# Patient Record
Sex: FEMALE | Race: WHITE | NOT HISPANIC OR LATINO | Employment: OTHER | URBAN - METROPOLITAN AREA
[De-identification: names, ages, dates, MRNs, and addresses within clinical notes are randomized per-mention and may not be internally consistent; named-entity substitution may affect disease eponyms.]

---

## 2021-12-02 ENCOUNTER — TELEPHONE (OUTPATIENT)
Dept: SCHEDULING | Facility: IMAGING CENTER | Age: 56
End: 2021-12-02

## 2021-12-02 ENCOUNTER — OFFICE VISIT (OUTPATIENT)
Dept: MEDICAL GROUP | Facility: PHYSICIAN GROUP | Age: 56
End: 2021-12-02
Payer: COMMERCIAL

## 2021-12-02 VITALS
WEIGHT: 202.6 LBS | RESPIRATION RATE: 16 BRPM | OXYGEN SATURATION: 98 % | DIASTOLIC BLOOD PRESSURE: 78 MMHG | BODY MASS INDEX: 30.01 KG/M2 | SYSTOLIC BLOOD PRESSURE: 112 MMHG | HEART RATE: 96 BPM | TEMPERATURE: 98.1 F | HEIGHT: 69 IN

## 2021-12-02 DIAGNOSIS — Z12.31 ENCOUNTER FOR SCREENING MAMMOGRAM FOR BREAST CANCER: ICD-10-CM

## 2021-12-02 DIAGNOSIS — L70.0 ACNE VULGARIS: ICD-10-CM

## 2021-12-02 DIAGNOSIS — Z12.11 SCREENING FOR COLORECTAL CANCER: ICD-10-CM

## 2021-12-02 DIAGNOSIS — Z00.00 ANNUAL PHYSICAL EXAM: ICD-10-CM

## 2021-12-02 DIAGNOSIS — E03.9 ACQUIRED HYPOTHYROIDISM: ICD-10-CM

## 2021-12-02 DIAGNOSIS — F32.A ANXIETY AND DEPRESSION: ICD-10-CM

## 2021-12-02 DIAGNOSIS — J30.89 ENVIRONMENTAL AND SEASONAL ALLERGIES: ICD-10-CM

## 2021-12-02 DIAGNOSIS — F41.9 ANXIETY AND DEPRESSION: ICD-10-CM

## 2021-12-02 DIAGNOSIS — Z12.12 SCREENING FOR COLORECTAL CANCER: ICD-10-CM

## 2021-12-02 PROCEDURE — 99214 OFFICE O/P EST MOD 30 MIN: CPT | Performed by: NURSE PRACTITIONER

## 2021-12-02 RX ORDER — CHOLECALCIFEROL (VITAMIN D3) 125 MCG
5 CAPSULE ORAL DAILY
COMMUNITY
End: 2023-04-19

## 2021-12-02 RX ORDER — TRETINOIN 0.5 MG/G
CREAM TOPICAL
COMMUNITY
Start: 2021-09-04 | End: 2023-04-19 | Stop reason: SDUPTHER

## 2021-12-02 RX ORDER — PSEUDOEPHEDRINE HCL 30 MG
60 TABLET ORAL EVERY 4 HOURS PRN
COMMUNITY

## 2021-12-02 RX ORDER — IBUPROFEN 200 MG
200 TABLET ORAL EVERY 6 HOURS PRN
COMMUNITY

## 2021-12-02 RX ORDER — LEVOTHYROXINE SODIUM 0.12 MG/1
125 TABLET ORAL
Qty: 30 TABLET | Refills: 0 | Status: SHIPPED | OUTPATIENT
Start: 2021-12-02 | End: 2021-12-23

## 2021-12-02 RX ORDER — BUPROPION HYDROCHLORIDE 300 MG/1
TABLET ORAL
COMMUNITY
Start: 2021-10-08 | End: 2022-01-19 | Stop reason: SDUPTHER

## 2021-12-02 ASSESSMENT — PATIENT HEALTH QUESTIONNAIRE - PHQ9
SUM OF ALL RESPONSES TO PHQ QUESTIONS 1-9: 20
5. POOR APPETITE OR OVEREATING: 3 - NEARLY EVERY DAY
CLINICAL INTERPRETATION OF PHQ2 SCORE: 2

## 2021-12-03 NOTE — ASSESSMENT & PLAN NOTE
Chronic problem.  Currently on bupropion  and citalopram 20 mg, previously worked well.  Patient went back to work and stress levels are higher, seems like needing reevaluation.  Denies SI, HI.

## 2021-12-03 NOTE — PROGRESS NOTES
Chief Complaint   Patient presents with   • Establish Care       HISTORY OF PRESENT ILLNESS: Patient is a 56 y.o. female, new  patient who presents today to discuss medical problems as listed below:    Health Maintenance:  COMPLETED     Acquired hypothyroidism  New to me.  Chronic problem.  Patient was diagnosed initially 20 years ago.  Currently on levothyroxine 125 mcg.  Last labs in the last 2 years, not available to view.  Patient reports hypothyroid symptoms (dry skin, constipation and hair loss), has 3 more pills left.  Needing refill today.    Anxiety and depression  Chronic problem.  Currently on bupropion  and citalopram 20 mg, previously worked well.  Patient went back to work and stress levels are higher, seems like needing reevaluation.  Denies SI, HI.    Acne vulgaris  New to me.  Currently on Retin-A and minocycline 100 mg which helps with acne and sinus congestion.    Environmental and seasonal allergies  New to me.  Chronic problem.  Currently on Allegra and Sudafed and Benadryl as needed.  Denies chest pressure, wheezing or cough.      Patient Active Problem List    Diagnosis Date Noted   • Acquired hypothyroidism 12/02/2021   • Anxiety and depression 12/02/2021   • Acne vulgaris 12/02/2021   • Environmental and seasonal allergies 12/02/2021        Allergies: Azithromycin    Current Outpatient Medications   Medication Sig Dispense Refill   • buPROPion (WELLBUTRIN XL) 300 MG XL tablet      • tretinoin (RETIN-A) 0.05 % cream      • pseudoephedrine (SUDAFED) 30 MG Tab Take 60 mg by mouth every four hours as needed for Congestion.     • Fexofenadine HCl (ALLEGRA PO) Take  by mouth.     • diphenhydrAMINE HCl (BENADRYL PO) Take  by mouth.     • melatonin 5 mg Tab Take 5 mg by mouth every day.     • ibuprofen (MOTRIN) 200 MG Tab Take 200 mg by mouth every 6 hours as needed.     • levothyroxine (SYNTHROID) 125 MCG Tab Take 1 Tablet by mouth every morning on an empty stomach. 30 Tablet 0   •  escitalopram (LEXAPRO) 20 MG tablet      • minocycline (MINOCIN) 100 MG Cap        No current facility-administered medications for this visit.       Social History     Tobacco Use   • Smoking status: Never Smoker   • Smokeless tobacco: Never Used   Vaping Use   • Vaping Use: Never used   Substance Use Topics   • Alcohol use: Yes     Comment: occ   • Drug use: Never     Social History     Social History Narrative   • Not on file       Family History   Problem Relation Age of Onset   • Hyperlipidemia Mother    • Hyperlipidemia Father        Allergies, past medical history, past surgical history, family history, social history reviewed and updated.    Review of Systems:     - Constitutional: Negative for fever, chills, unexpected weight change, and fatigue/generalized weakness.     - HEENT: Negative for headaches, vision changes, hearing changes, ear pain, ear discharge, rhinorrhea, sinus congestion, sore throat, and neck pain.      - Respiratory: Negative for cough, sputum production, chest congestion, dyspnea, wheezing, and crackles.      - Cardiovascular: Negative for chest pain, palpitations, orthopnea, and bilateral lower extremity edema.     - Gastrointestinal: Negative for heartburn, nausea, vomiting, abdominal pain, hematochezia, melena, diarrhea, constipation, and greasy/foul-smelling stools.     - Genitourinary: Negative for dysuria, polyuria, hematuria, pyuria, urinary urgency, and urinary incontinence.    - Musculoskeletal: Negative for myalgias, back pain, and joint pain.     - Skin: Negative for rash, itching, cyanotic skin color change.     - Neurological: Negative for dizziness, tingling, tremors, focal sensory deficit, focal weakness and headaches.     - Endo/Heme/Allergies: Does not bruise/bleed easily.     - Psychiatric/Behavioral: Negative for depression, suicidal/homicidal ideation and memory loss.      All other systems reviewed and are negative    Exam:    /78   Pulse 96   Temp 36.7 °C  "(98.1 °F) (Temporal)   Resp 16   Ht 1.746 m (5' 8.75\")   Wt 91.9 kg (202 lb 9.6 oz)   SpO2 98%   BMI 30.14 kg/m²  Body mass index is 30.14 kg/m².    Physical Exam:  Constitutional: Well-developed and well-nourished. Not diaphoretic. No distress.   Skin: Skin is warm and dry. No rash noted.  Head: Atraumatic without lesions.  Eyes: Conjunctivae and extraocular motions are normal. Pupils are equal, round, and reactive to light. No scleral icterus.   Ears:  External ears unremarkable. Tympanic membranes clear and intact.  Nose: Nares patent. Septum midline. Turbinates without erythema nor edema. No discharge.   Mouth/Throat: Dentition is normal. Tongue normal. Oropharynx is clear and moist. Posterior pharynx without erythema or exudates.  Neck: Supple, trachea midline. Normal range of motion. No thyromegaly present. No lymphadenopathy--cervical or supraclavicular.  Cardiovascular: Regular rate and rhythm, S1 and S2 without murmur, rubs, or gallops.    Chest: Effort normal. Clear to auscultation throughout. No adventitious sounds. No CVA tenderness.  Abdomen: Soft, non tender, and without distention. Active bowel sounds in all four quadrants. No rebound, guarding, masses or HSM.  : Negative for dysuria, polyuria, hematuria, pyuria, urinary urgency, and urinary incontinence.  Extremities: No cyanosis, clubbing, erythema, nor edema. Distal pulses intact and symmetric.   Musculoskeletal: All major joints AROM full in all directions without pain.  Neurological: Alert and oriented x 3. DTRs 2+/3 and symmetric. No cranial nerve deficit. 5/5 myotomes. Sensation intact. Negative Rhomberg.  Psychiatric:  Behavior, mood, and affect are appropriate.  MA/nursing note and vitals reviewed.    Assessment/Plan:  1. Acquired hypothyroidism  We will obtain lab data and discuss findings on the following visit.  30-day supply given.  - levothyroxine (SYNTHROID) 125 MCG Tab; Take 1 Tablet by mouth every morning on an empty stomach.  " Dispense: 30 Tablet; Refill: 0    2. Anxiety and depression  Stable at this time.  Will obtain labs and discuss findings with patient.  Will make adjustments as needed on the following visit    3. Acne vulgaris  Stable on current regimen.  Continue    4. Environmental and seasonal allergies  Stable on current regimen.  Continue    5. Screening for colorectal cancer  - Referral to GI for Colonoscopy    6. Encounter for screening mammogram for breast cancer  - MA-SCREENING MAMMO BILAT W/TOMOSYNTHESIS W/CAD; Future    7. Annual physical exam  - Comp Metabolic Panel; Future  - CBC WITHOUT DIFFERENTIAL; Future  - TSH; Future  - FREE THYROXINE; Future  - Lipid Profile; Future  - VITAMIN D,25 HYDROXY; Future       Discussed with patient possible alternative diagnoses, pt is to take all medications as prescribed. If symptoms persist FU w/PCP, if symptoms worsen go to emergency room. If experiencing any side effects from prescribed medications reports to the office immediately or go to emergency room.  Reviewed indication, dosage, usage and potential adverse effects of prescribed medications. Reviewed risks and benefits of treatment plan. Patient verbalizes understanding of all instruction and verbally agrees to plan.    No follow-ups on file. 2-3 wks

## 2021-12-03 NOTE — ASSESSMENT & PLAN NOTE
New to me.  Currently on Retin-A and minocycline 100 mg which helps with acne and sinus congestion.

## 2021-12-03 NOTE — ASSESSMENT & PLAN NOTE
New to me.  Chronic problem.  Currently on Allegra and Sudafed and Benadryl as needed.  Denies chest pressure, wheezing or cough.

## 2021-12-03 NOTE — ASSESSMENT & PLAN NOTE
New to me.  Chronic problem.  Patient was diagnosed initially 20 years ago.  Currently on levothyroxine 125 mcg.  Last labs in the last 2 years, not available to view.  Patient reports hypothyroid symptoms (dry skin, constipation and hair loss), has 3 more pills left.  Needing refill today.

## 2021-12-10 ENCOUNTER — HOSPITAL ENCOUNTER (OUTPATIENT)
Facility: MEDICAL CENTER | Age: 56
End: 2021-12-10
Attending: NURSE PRACTITIONER
Payer: COMMERCIAL

## 2021-12-10 DIAGNOSIS — Z00.00 ANNUAL PHYSICAL EXAM: ICD-10-CM

## 2021-12-10 LAB
ALBUMIN SERPL BCP-MCNC: 4.5 G/DL (ref 3.2–4.9)
ALBUMIN/GLOB SERPL: 1.8 G/DL
ALP SERPL-CCNC: 74 U/L (ref 30–99)
ALT SERPL-CCNC: 19 U/L (ref 2–50)
ANION GAP SERPL CALC-SCNC: 11 MMOL/L (ref 7–16)
AST SERPL-CCNC: 17 U/L (ref 12–45)
BILIRUB SERPL-MCNC: 0.2 MG/DL (ref 0.1–1.5)
BUN SERPL-MCNC: 20 MG/DL (ref 8–22)
CALCIUM SERPL-MCNC: 9 MG/DL (ref 8.5–10.5)
CHLORIDE SERPL-SCNC: 105 MMOL/L (ref 96–112)
CHOLEST SERPL-MCNC: 251 MG/DL (ref 100–199)
CO2 SERPL-SCNC: 25 MMOL/L (ref 20–33)
CREAT SERPL-MCNC: 0.82 MG/DL (ref 0.5–1.4)
ERYTHROCYTE [DISTWIDTH] IN BLOOD BY AUTOMATED COUNT: 41.1 FL (ref 35.9–50)
FASTING STATUS PATIENT QL REPORTED: NORMAL
GLOBULIN SER CALC-MCNC: 2.5 G/DL (ref 1.9–3.5)
GLUCOSE SERPL-MCNC: 83 MG/DL (ref 65–99)
HCT VFR BLD AUTO: 42.4 % (ref 37–47)
HDLC SERPL-MCNC: 49 MG/DL
HGB BLD-MCNC: 13.9 G/DL (ref 12–16)
LDLC SERPL CALC-MCNC: 178 MG/DL
MCH RBC QN AUTO: 28.8 PG (ref 27–33)
MCHC RBC AUTO-ENTMCNC: 32.8 G/DL (ref 33.6–35)
MCV RBC AUTO: 87.8 FL (ref 81.4–97.8)
PLATELET # BLD AUTO: 254 K/UL (ref 164–446)
PMV BLD AUTO: 10.2 FL (ref 9–12.9)
POTASSIUM SERPL-SCNC: 4.2 MMOL/L (ref 3.6–5.5)
PROT SERPL-MCNC: 7 G/DL (ref 6–8.2)
RBC # BLD AUTO: 4.83 M/UL (ref 4.2–5.4)
SODIUM SERPL-SCNC: 141 MMOL/L (ref 135–145)
T4 FREE SERPL-MCNC: 1.37 NG/DL (ref 0.93–1.7)
TRIGL SERPL-MCNC: 118 MG/DL (ref 0–149)
TSH SERPL DL<=0.005 MIU/L-ACNC: 0.26 UIU/ML (ref 0.38–5.33)
WBC # BLD AUTO: 5.1 K/UL (ref 4.8–10.8)

## 2021-12-10 PROCEDURE — 80061 LIPID PANEL: CPT

## 2021-12-10 PROCEDURE — 84439 ASSAY OF FREE THYROXINE: CPT

## 2021-12-10 PROCEDURE — 84443 ASSAY THYROID STIM HORMONE: CPT

## 2021-12-10 PROCEDURE — 82306 VITAMIN D 25 HYDROXY: CPT

## 2021-12-10 PROCEDURE — 36415 COLL VENOUS BLD VENIPUNCTURE: CPT

## 2021-12-10 PROCEDURE — 85027 COMPLETE CBC AUTOMATED: CPT

## 2021-12-10 PROCEDURE — 80053 COMPREHEN METABOLIC PANEL: CPT

## 2021-12-13 LAB — 25(OH)D3 SERPL-MCNC: 25 NG/ML (ref 30–80)

## 2021-12-23 ENCOUNTER — OFFICE VISIT (OUTPATIENT)
Dept: MEDICAL GROUP | Facility: PHYSICIAN GROUP | Age: 56
End: 2021-12-23
Payer: COMMERCIAL

## 2021-12-23 VITALS
WEIGHT: 202.8 LBS | RESPIRATION RATE: 18 BRPM | SYSTOLIC BLOOD PRESSURE: 122 MMHG | BODY MASS INDEX: 30.04 KG/M2 | OXYGEN SATURATION: 98 % | TEMPERATURE: 97.8 F | HEART RATE: 85 BPM | HEIGHT: 69 IN | DIASTOLIC BLOOD PRESSURE: 78 MMHG

## 2021-12-23 DIAGNOSIS — E78.2 MIXED HYPERLIPIDEMIA: ICD-10-CM

## 2021-12-23 DIAGNOSIS — E03.9 ACQUIRED HYPOTHYROIDISM: ICD-10-CM

## 2021-12-23 DIAGNOSIS — E55.9 VITAMIN D DEFICIENCY: ICD-10-CM

## 2021-12-23 PROCEDURE — 99214 OFFICE O/P EST MOD 30 MIN: CPT | Performed by: NURSE PRACTITIONER

## 2021-12-23 RX ORDER — ERGOCALCIFEROL 1.25 MG/1
50000 CAPSULE ORAL
Qty: 12 CAPSULE | Refills: 3 | Status: SHIPPED | OUTPATIENT
Start: 2021-12-23 | End: 2022-11-10 | Stop reason: SDUPTHER

## 2021-12-23 RX ORDER — LEVOTHYROXINE SODIUM 0.12 MG/1
TABLET ORAL
Qty: 30 TABLET | Refills: 3 | Status: SHIPPED | OUTPATIENT
Start: 2021-12-23 | End: 2022-03-30 | Stop reason: SDUPTHER

## 2021-12-23 RX ORDER — LEVOTHYROXINE SODIUM 112 UG/1
112 TABLET ORAL
Qty: 90 TABLET | Refills: 1 | Status: SHIPPED | OUTPATIENT
Start: 2021-12-23 | End: 2022-03-30

## 2021-12-23 ASSESSMENT — FIBROSIS 4 INDEX: FIB4 SCORE: 0.88

## 2021-12-23 NOTE — ASSESSMENT & PLAN NOTE
Results for ISAIAH GOODRICH (MRN [id number]) as of 12/23/2021 08:32   Ref. Range 12/10/2021 08:29   TSH Latest Ref Range: 0.380 - 5.330 uIU/mL 0.260 (L)   Free T-4 Latest Ref Range: 0.93 - 1.70 ng/dL 1.37   Levothyroxine 125 mcg.  Admits to anxiety and insomnia

## 2021-12-23 NOTE — PROGRESS NOTES
Chief Complaint   Patient presents with   • Lab Results       HISTORY OF PRESENT ILLNESS: Patient is a 56 y.o. female, established patient who presents today to discuss medical problems as listed below:    Health Maintenance:  COMPLETED     Mixed hyperlipidemia  Results for ISAIAH GOODRICH (MRN [id number]) as of 12/23/2021 08:32   Ref. Range 12/10/2021 08:29   Cholesterol,Tot Latest Ref Range: 100 - 199 mg/dL 251 (H)   Triglycerides Latest Ref Range: 0 - 149 mg/dL 118   HDL Latest Ref Range: >=40 mg/dL 49   LDL Latest Ref Range: <100 mg/dL 178 (H)   The 10-year ASCVD risk score (David LÓPEZ Jr., et al., 2013) is: 3%    Values used to calculate the score:      Age: 56 years      Sex: Female      Is Non- : No      Diabetic: No      Tobacco smoker: No      Systolic Blood Pressure: 122 mmHg      Is BP treated: No      HDL Cholesterol: 49 mg/dL      Total Cholesterol: 251 mg/dL     Patient is aware of putting in a few extra pounds during holidays.  She will resume healthy nutrition and exercise starting the new year.  Denies CP, dyspnea, dizziness or peripheral edema.    Acquired hypothyroidism  Results for ISAIAH GOODRICH (MRN [id number]) as of 12/23/2021 08:32   Ref. Range 12/10/2021 08:29   TSH Latest Ref Range: 0.380 - 5.330 uIU/mL 0.260 (L)   Free T-4 Latest Ref Range: 0.93 - 1.70 ng/dL 1.37   Levothyroxine 125 mcg.  Admits to anxiety and insomnia    Vitamin D deficiency  Results for ISAIAH GOODRICH (MRN [id number]) as of 12/23/2021 08:32   Ref. Range 12/10/2021 08:29   25-Hydroxy   Vitamin D 25 Latest Ref Range: 30 - 80 ng/mL 25 (L)       Patient Active Problem List    Diagnosis Date Noted   • Mixed hyperlipidemia 12/23/2021   • Vitamin D deficiency 12/23/2021   • Acquired hypothyroidism 12/02/2021   • Anxiety and depression 12/02/2021   • Acne vulgaris 12/02/2021   • Environmental and seasonal allergies 12/02/2021        Allergies: Azithromycin    Current Outpatient Medications   Medication Sig Dispense  "Refill   • levothyroxine (SYNTHROID) 112 MCG Tab Take 1 Tablet by mouth every morning on an empty stomach. 90 Tablet 1   • vitamin D2, Ergocalciferol, (DRISDOL) 1.25 MG (39105 UT) Cap capsule Take 1 Capsule by mouth every 7 days. 12 Capsule 3   • buPROPion (WELLBUTRIN XL) 300 MG XL tablet      • tretinoin (RETIN-A) 0.05 % cream      • pseudoephedrine (SUDAFED) 30 MG Tab Take 60 mg by mouth every four hours as needed for Congestion.     • Fexofenadine HCl (ALLEGRA PO) Take  by mouth.     • diphenhydrAMINE HCl (BENADRYL PO) Take  by mouth.     • melatonin 5 mg Tab Take 5 mg by mouth every day.     • ibuprofen (MOTRIN) 200 MG Tab Take 200 mg by mouth every 6 hours as needed.     • escitalopram (LEXAPRO) 20 MG tablet      • minocycline (MINOCIN) 100 MG Cap        No current facility-administered medications for this visit.       Social History     Tobacco Use   • Smoking status: Never Smoker   • Smokeless tobacco: Never Used   Vaping Use   • Vaping Use: Never used   Substance Use Topics   • Alcohol use: Yes     Comment: occ   • Drug use: Never     Social History     Social History Narrative   • Not on file       Family History   Problem Relation Age of Onset   • Hyperlipidemia Mother    • Hyperlipidemia Father        Allergies, past medical history, past surgical history, family history, social history reviewed and updated.    Review of Systems:     - Constitutional: Negative for fever, chills, unexpected weight change, and fatigue/generalized weakness.     - Respiratory: Negative for cough, sputum production, chest congestion, dyspnea, wheezing, and crackles.      - Cardiovascular: Negative for chest pain, palpitations, orthopnea, and bilateral lower extremity edema.     - Psychiatric/Behavioral: Negative for depression, suicidal/homicidal ideation and memory loss.      All other systems reviewed and are negative    Exam:    /78   Pulse 85   Temp 36.6 °C (97.8 °F) (Temporal)   Resp 18   Ht 1.746 m (5' 8.75\")  "  Wt 92 kg (202 lb 12.8 oz)   SpO2 98%   BMI 30.17 kg/m²  Body mass index is 30.17 kg/m².    Physical Exam:  Constitutional: Well-developed and well-nourished. Not diaphoretic. No distress.   Cardiovascular: Regular rate and rhythm, S1 and S2 without murmur, rubs, or gallops.    Chest: Effort normal. Clear to auscultation throughout. No adventitious sounds.   Neurological: Alert and oriented x 3.   Psychiatric:  Behavior, mood, and affect are appropriate.  MA/nursing note and vitals reviewed.    LABS:21/21   results reviewed and discussed with the patient, questions answered.    Assessment/Plan:  1. Mixed hyperlipidemia  Uncontrolled, stable. Discussed etiology and potential risk factors. Discussed 10-year ASCVD risk. Educated on healthy lifestyle including nutrition and exercise.  Avoid excessive red meat and simple carbohydrates.  Increase fiber intake to 25-30 g daily if tolerated and take fish oil 2 g nightly.  Advised low saturated fat, low carbohydrate diet.  Quit smoking if you do. Maintain adequate hydration. Advise daily exercise 45-60 mins per tolerance and preference.    2. Acquired hypothyroidism  Uncontrolled, stable.  Discussed etiology.  Will decrease levothyroxine 212 mcg  - TSH; Future  - FREE THYROXINE; Future  - levothyroxine (SYNTHROID) 112 MCG Tab; Take 1 Tablet by mouth every morning on an empty stomach.  Dispense: 90 Tablet; Refill: 1    3. Vitamin D deficiency  - vitamin D2, Ergocalciferol, (DRISDOL) 1.25 MG (97337 UT) Cap capsule; Take 1 Capsule by mouth every 7 days.  Dispense: 12 Capsule; Refill: 3       Discussed with patient possible alternative diagnoses, pt is to take all medications as prescribed. If symptoms persist FU w/PCP, if symptoms worsen go to emergency room. If experiencing any side effects from prescribed medications reports to the office immediately or go to emergency room.  Reviewed indication, dosage, usage and potential adverse effects of prescribed medications.  Reviewed risks and benefits of treatment plan. Patient verbalizes understanding of all instruction and verbally agrees to plan.    No follow-ups on file. 3 mos

## 2021-12-23 NOTE — ASSESSMENT & PLAN NOTE
Results for ISAIAH GOODRICH (MRN [id number]) as of 12/23/2021 08:32   Ref. Range 12/10/2021 08:29   25-Hydroxy   Vitamin D 25 Latest Ref Range: 30 - 80 ng/mL 25 (L)

## 2021-12-23 NOTE — ASSESSMENT & PLAN NOTE
Results for ISAIAH GOODRICH (MRN [id number]) as of 12/23/2021 08:32   Ref. Range 12/10/2021 08:29   Cholesterol,Tot Latest Ref Range: 100 - 199 mg/dL 251 (H)   Triglycerides Latest Ref Range: 0 - 149 mg/dL 118   HDL Latest Ref Range: >=40 mg/dL 49   LDL Latest Ref Range: <100 mg/dL 178 (H)   The 10-year ASCVD risk score (David LÓPEZ Jr., et al., 2013) is: 3%    Values used to calculate the score:      Age: 56 years      Sex: Female      Is Non- : No      Diabetic: No      Tobacco smoker: No      Systolic Blood Pressure: 122 mmHg      Is BP treated: No      HDL Cholesterol: 49 mg/dL      Total Cholesterol: 251 mg/dL     Patient is aware of putting in a few extra pounds during holidays.  She will resume healthy nutrition and exercise starting the new year.  Denies CP, dyspnea, dizziness or peripheral edema.

## 2022-01-19 DIAGNOSIS — F41.9 ANXIETY AND DEPRESSION: ICD-10-CM

## 2022-01-19 DIAGNOSIS — F32.A ANXIETY AND DEPRESSION: ICD-10-CM

## 2022-01-19 RX ORDER — ESCITALOPRAM OXALATE 20 MG/1
20 TABLET ORAL DAILY
Qty: 90 TABLET | Refills: 1 | Status: SHIPPED | OUTPATIENT
Start: 2022-01-19 | End: 2022-03-30 | Stop reason: SDUPTHER

## 2022-01-19 RX ORDER — BUPROPION HYDROCHLORIDE 300 MG/1
300 TABLET ORAL EVERY MORNING
Qty: 90 TABLET | Refills: 0 | Status: SHIPPED | OUTPATIENT
Start: 2022-01-19 | End: 2022-03-30 | Stop reason: SDUPTHER

## 2022-03-30 ENCOUNTER — OFFICE VISIT (OUTPATIENT)
Dept: MEDICAL GROUP | Facility: PHYSICIAN GROUP | Age: 57
End: 2022-03-30
Payer: COMMERCIAL

## 2022-03-30 VITALS
WEIGHT: 202.2 LBS | OXYGEN SATURATION: 98 % | SYSTOLIC BLOOD PRESSURE: 126 MMHG | RESPIRATION RATE: 18 BRPM | HEART RATE: 83 BPM | TEMPERATURE: 96.7 F | DIASTOLIC BLOOD PRESSURE: 78 MMHG | HEIGHT: 69 IN | BODY MASS INDEX: 29.95 KG/M2

## 2022-03-30 DIAGNOSIS — R06.02 SOB (SHORTNESS OF BREATH): ICD-10-CM

## 2022-03-30 DIAGNOSIS — Z00.00 ANNUAL PHYSICAL EXAM: ICD-10-CM

## 2022-03-30 DIAGNOSIS — E55.9 VITAMIN D DEFICIENCY: ICD-10-CM

## 2022-03-30 DIAGNOSIS — J30.89 ENVIRONMENTAL AND SEASONAL ALLERGIES: ICD-10-CM

## 2022-03-30 DIAGNOSIS — E03.9 ACQUIRED HYPOTHYROIDISM: ICD-10-CM

## 2022-03-30 DIAGNOSIS — R05.9 COUGH: ICD-10-CM

## 2022-03-30 DIAGNOSIS — R07.89 FEELING OF CHEST TIGHTNESS: ICD-10-CM

## 2022-03-30 DIAGNOSIS — L70.0 ACNE VULGARIS: ICD-10-CM

## 2022-03-30 DIAGNOSIS — F41.9 ANXIETY AND DEPRESSION: ICD-10-CM

## 2022-03-30 DIAGNOSIS — F32.A ANXIETY AND DEPRESSION: ICD-10-CM

## 2022-03-30 DIAGNOSIS — J30.2 SEASONAL ALLERGIC RHINITIS, UNSPECIFIED TRIGGER: ICD-10-CM

## 2022-03-30 PROBLEM — Z90.710 S/P LAPAROSCOPIC HYSTERECTOMY: Status: ACTIVE | Noted: 2019-11-06

## 2022-03-30 PROBLEM — D25.9 LEIOMYOMA OF BODY OF UTERUS: Status: ACTIVE | Noted: 2019-11-07

## 2022-03-30 PROCEDURE — 99214 OFFICE O/P EST MOD 30 MIN: CPT | Performed by: NURSE PRACTITIONER

## 2022-03-30 RX ORDER — LANOLIN ALCOHOL/MO/W.PET/CERES
1 CREAM (GRAM) TOPICAL DAILY
COMMUNITY
End: 2022-08-19

## 2022-03-30 RX ORDER — ACETAMINOPHEN 500 MG
500 TABLET ORAL
COMMUNITY
End: 2022-08-19

## 2022-03-30 RX ORDER — LEVOTHYROXINE SODIUM 0.12 MG/1
125 TABLET ORAL
Qty: 90 TABLET | Refills: 0 | Status: SHIPPED | OUTPATIENT
Start: 2022-03-30 | End: 2022-06-30

## 2022-03-30 RX ORDER — THYROID, PORCINE 60 MG/1
TABLET ORAL
COMMUNITY
End: 2022-08-19

## 2022-03-30 RX ORDER — TRIAMCINOLONE ACETONIDE 40 MG/ML
40 INJECTION, SUSPENSION INTRA-ARTICULAR; INTRAMUSCULAR ONCE
Status: COMPLETED | OUTPATIENT
Start: 2022-03-30 | End: 2022-03-30

## 2022-03-30 RX ORDER — LEVOTHYROXINE SODIUM 0.12 MG/1
125 TABLET ORAL
Qty: 30 TABLET | Refills: 1 | Status: SHIPPED | OUTPATIENT
Start: 2022-03-30 | End: 2022-03-30

## 2022-03-30 RX ORDER — MAGNESIUM OXIDE 400 MG/1
400 TABLET ORAL DAILY
COMMUNITY

## 2022-03-30 RX ORDER — ALBUTEROL SULFATE 90 UG/1
2 AEROSOL, METERED RESPIRATORY (INHALATION) EVERY 6 HOURS PRN
Qty: 8.5 G | Refills: 1 | Status: SHIPPED | OUTPATIENT
Start: 2022-03-30 | End: 2022-08-09

## 2022-03-30 RX ORDER — FLUTICASONE PROPIONATE 50 MCG
1 SPRAY, SUSPENSION (ML) NASAL DAILY
COMMUNITY
End: 2022-08-19

## 2022-03-30 RX ORDER — BUPROPION HYDROCHLORIDE 300 MG/1
300 TABLET ORAL EVERY MORNING
Qty: 90 TABLET | Refills: 0 | Status: SHIPPED | OUTPATIENT
Start: 2022-03-30 | End: 2022-06-30

## 2022-03-30 RX ORDER — MINOCYCLINE HYDROCHLORIDE 100 MG/1
100 CAPSULE ORAL 2 TIMES DAILY
Qty: 60 CAPSULE | Refills: 1 | Status: SHIPPED | OUTPATIENT
Start: 2022-03-30 | End: 2022-08-16

## 2022-03-30 RX ORDER — ESCITALOPRAM OXALATE 20 MG/1
20 TABLET ORAL DAILY
Qty: 90 TABLET | Refills: 0 | Status: SHIPPED | OUTPATIENT
Start: 2022-03-30 | End: 2022-07-05 | Stop reason: SDUPTHER

## 2022-03-30 RX ADMIN — TRIAMCINOLONE ACETONIDE 40 MG: 40 INJECTION, SUSPENSION INTRA-ARTICULAR; INTRAMUSCULAR at 09:40

## 2022-03-30 ASSESSMENT — PATIENT HEALTH QUESTIONNAIRE - PHQ9
5. POOR APPETITE OR OVEREATING: 3 - NEARLY EVERY DAY
SUM OF ALL RESPONSES TO PHQ QUESTIONS 1-9: 23
CLINICAL INTERPRETATION OF PHQ2 SCORE: 6

## 2022-03-30 ASSESSMENT — FIBROSIS 4 INDEX: FIB4 SCORE: 0.88

## 2022-03-30 NOTE — ASSESSMENT & PLAN NOTE
Pt reports exacerbating allergy symptoms incl cough, chest tightness, nasal drainage, PND after exercise, worse when outside and during allergy season. No CP, SOB, no wheezing. No fevers.   Received conrticosteroid injection in 2005, tolerated well.  Taking Allegra, Benadryl and Flonase, somewhat helpful but does not last.  No Hx of DM, fractures, no Hx of osteoporosis.

## 2022-03-30 NOTE — PROGRESS NOTES
Chief Complaint   Patient presents with   • Follow-Up     Increasing lexapro   • Seasonal Allergies   • Chest Pain     Comes and goes for the past wk        HISTORY OF PRESENT ILLNESS: Patient is a 56 y.o. female, established patient who presents today to discuss medical problems as listed below:    Health Maintenance:  COMPLETED     Environmental and seasonal allergies  Pt reports cough, chest tightness, nasal drainage, PND after exercise, worse when outside and during allergy season. No CP, SOB, no wheezing. No fevers.   Received steroid injection in 2005, tolerated well.  Taking Allegra, bennadryl and flonase.  No Hx of DM, fractures, no Hx of osteoporosis.     Acne vulgaris  Chronic problem. Symptoms include tender Cystic facial rash. Well controlled with minocycline 100 mg BID. Needing refills today.       Patient Active Problem List    Diagnosis Date Noted   • Mixed hyperlipidemia 12/23/2021   • Vitamin D deficiency 12/23/2021   • Acquired hypothyroidism 12/02/2021   • Anxiety and depression 12/02/2021   • Acne vulgaris 12/02/2021   • Environmental and seasonal allergies 12/02/2021   • Leiomyoma of body of uterus 11/07/2019   • S/P laparoscopic hysterectomy 11/06/2019   • Allergic rhinitis 09/30/2015        Allergies: Azithromycin    Current Outpatient Medications   Medication Sig Dispense Refill   • acetaminophen (TYLENOL) 500 MG Tab Take 500 mg by mouth.     • vitamin D3 (CHOLECALCIFEROL) 5000 Unit (125 mcg) Tab Take 5,000 Units by mouth every day.     • diphenhydramine (SOMINEX) 25 MG tablet Take 25 mg by mouth.     • fluticasone (FLONASE) 50 MCG/ACT nasal spray Administer 1 Spray into affected nostril(S) every day.     • glucosamine-chondroitin 500-400 MG tablet Take 1 Tablet by mouth every day.     • magnesium oxide (MAG-OX) 400 MG Tab tablet Take 400 mg by mouth every day.     • Multiple Vitamin (MULTI-VITAMIN) Tab Take 1 Tablet by mouth every day.     • thyroid 65 MG tablet Take  by mouth.     •  albuterol 108 (90 Base) MCG/ACT Aero Soln inhalation aerosol Inhale 2 Puffs every 6 hours as needed for Shortness of Breath. 8.5 g 1   • levothyroxine (SYNTHROID) 125 MCG Tab Take 1 Tablet by mouth every morning on an empty stomach. 30 Tablet 1   • buPROPion (WELLBUTRIN XL) 300 MG XL tablet Take 1 Tablet by mouth every morning. 90 Tablet 0   • escitalopram (LEXAPRO) 20 MG tablet Take 1 Tablet by mouth every day. 90 Tablet 0   • minocycline (MINOCIN) 100 MG Cap Take 1 Capsule by mouth 2 times a day. 60 Capsule 1   • vitamin D2, Ergocalciferol, (DRISDOL) 1.25 MG (79942 UT) Cap capsule Take 1 Capsule by mouth every 7 days. 12 Capsule 3   • tretinoin (RETIN-A) 0.05 % cream      • pseudoephedrine (SUDAFED) 30 MG Tab Take 60 mg by mouth every four hours as needed for Congestion.     • Fexofenadine HCl (ALLEGRA PO) Take  by mouth.     • diphenhydrAMINE HCl (BENADRYL PO) Take  by mouth.     • melatonin 5 mg Tab Take 5 mg by mouth every day.     • ibuprofen (MOTRIN) 200 MG Tab Take 200 mg by mouth every 6 hours as needed.       Current Facility-Administered Medications   Medication Dose Route Frequency Provider Last Rate Last Admin   • triamcinolone acetonide (KENALOG-40) injection 40 mg  40 mg Intramuscular Once COLLIN Sanchez           Social History     Tobacco Use   • Smoking status: Never Smoker   • Smokeless tobacco: Never Used   Vaping Use   • Vaping Use: Never used   Substance Use Topics   • Alcohol use: Yes     Comment: occ   • Drug use: Never     Social History     Social History Narrative   • Not on file       Family History   Problem Relation Age of Onset   • Hyperlipidemia Mother    • Hyperlipidemia Father        Allergies, past medical history, past surgical history, family history, social history reviewed and updated.    Review of Systems:     - Constitutional: Negative for fever, chills, unexpected weight change, and fatigue/generalized weakness.     - HEENT: Negative for headaches, vision changes,  "hearing changes, ear pain, ear discharge, rhinorrhea, sinus congestion, sore throat, and neck pain.      - Respiratory: Negative for cough, sputum production, chest congestion, dyspnea, wheezing, and crackles.      - Cardiovascular: Negative for chest pain, palpitations, orthopnea, and bilateral lower extremity edema.     - Gastrointestinal: Negative for heartburn, nausea, vomiting, abdominal pain, hematochezia, melena, diarrhea, constipation, and greasy/foul-smelling stools.     - Genitourinary: Negative for dysuria, polyuria, hematuria, pyuria, urinary urgency, and urinary incontinence.    - Musculoskeletal: Negative for myalgias, back pain, and joint pain.     - Skin: Negative for rash, itching, cyanotic skin color change.     - Neurological: Negative for dizziness, tingling, tremors, focal sensory deficit, focal weakness and headaches.     - Endo/Heme/Allergies: Does not bruise/bleed easily.     - Psychiatric/Behavioral: Negative for depression, suicidal/homicidal ideation and memory loss.      All other systems reviewed and are negative    Exam:    /78   Pulse 83   Temp 35.9 °C (96.7 °F) (Temporal)   Resp 18   Ht 1.746 m (5' 8.75\")   Wt 91.7 kg (202 lb 3.2 oz)   SpO2 98%   BMI 30.08 kg/m²  Body mass index is 30.08 kg/m².    Physical Exam:  Constitutional: Well-developed and well-nourished. Not diaphoretic. No distress.   Skin: Skin is warm and dry. No rash noted.  Head: Atraumatic without lesions.  Eyes: Conjunctivae and extraocular motions are normal. Pupils are equal, round, and reactive to light. No scleral icterus.   Ears:  External ears unremarkable. Tympanic membranes clear and intact.  Nose: Nares patent. Septum midline. Turbinates without erythema nor edema. No discharge.   Mouth/Throat: Dentition is normal. Tongue normal. Oropharynx is clear and moist. Posterior pharynx without erythema or exudates.  Neck: Supple, trachea midline. Normal range of motion. No thyromegaly present. No " lymphadenopathy--cervical or supraclavicular.  Cardiovascular: Regular rate and rhythm, S1 and S2 without murmur, rubs, or gallops.    Chest: Effort normal. Clear to auscultation throughout. No adventitious sounds. No CVA tenderness.  Abdomen: Soft, non tender, and without distention. Active bowel sounds in all four quadrants. No rebound, guarding, masses or HSM.  : Negative for dysuria, polyuria, hematuria, pyuria, urinary urgency, and urinary incontinence.  Extremities: No cyanosis, clubbing, erythema, nor edema. Distal pulses intact and symmetric.   Musculoskeletal: All major joints AROM full in all directions without pain.  Neurological: Alert and oriented x 3. DTRs 2+/3 and symmetric. No cranial nerve deficit. 5/5 myotomes. Sensation intact. Negative Rhomberg.  Psychiatric:  Behavior, mood, and affect are appropriate.  MA/nursing note and vitals reviewed.    LABS: pt needs to obtain        Assessment/Plan:    Pt needed med refills for the next two mos, she is busy at work and will obtain annual labs in a month.     1. Environmental and seasonal allergies  Will test for adult onset of asthma.   - PULMONARY FUNCTION TESTS -Test requested: Complete Pulmonary Function Test; Future    2. Cough  - PULMONARY FUNCTION TESTS -Test requested: Complete Pulmonary Function Test; Future  - DX-CHEST-2 VIEWS; Future    3. Feeling of chest tightness  - PULMONARY FUNCTION TESTS -Test requested: Complete Pulmonary Function Test; Future  - DX-CHEST-2 VIEWS; Future    4. SOB (shortness of breath)  - DX-CHEST-2 VIEWS; Future  - albuterol 108 (90 Base) MCG/ACT Aero Soln inhalation aerosol; Inhale 2 Puffs every 6 hours as needed for Shortness of Breath.  Dispense: 8.5 g; Refill: 1    5. Seasonal allergic rhinitis, unspecified trigger  - triamcinolone acetonide (KENALOG-40) injection 40 mg    6. Annual physical exam  - VITAMIN B12; Future  - FOLATE; Future  - Comp Metabolic Panel; Future  - CBC WITHOUT DIFFERENTIAL; Future  - Lipid  Profile; Future  - VITAMIN D,25 HYDROXY; Future    7. Acquired hypothyroidism  - levothyroxine (SYNTHROID) 125 MCG Tab; Take 1 Tablet by mouth every morning on an empty stomach.  Dispense: 30 Tablet; Refill: 1    8. Anxiety and depression  - buPROPion (WELLBUTRIN XL) 300 MG XL tablet; Take 1 Tablet by mouth every morning.  Dispense: 90 Tablet; Refill: 0  - escitalopram (LEXAPRO) 20 MG tablet; Take 1 Tablet by mouth every day.  Dispense: 90 Tablet; Refill: 0    9. Acne vulgaris  - minocycline (MINOCIN) 100 MG Cap; Take 1 Capsule by mouth 2 times a day.  Dispense: 60 Capsule; Refill: 1    10. Vitamin D deficiency  - VITAMIN D,25 HYDROXY; Future         Discussed with patient possible alternative diagnoses, pt is to take all medications as prescribed. If symptoms persist FU w/PCP, if symptoms worsen go to emergency room. If experiencing any side effects from prescribed medications reports to the office immediately or go to emergency room.  Reviewed indication, dosage, usage and potential adverse effects of prescribed medications. Reviewed risks and benefits of treatment plan. Patient verbalizes understanding of all instruction and verbally agrees to plan.    No follow-ups on file. 1-2 mos

## 2022-03-30 NOTE — ASSESSMENT & PLAN NOTE
Chronic problem. Symptoms include tender Cystic facial rash. Well controlled with minocycline 100 mg BID. Needing refills today.

## 2022-06-28 DIAGNOSIS — F32.A ANXIETY AND DEPRESSION: ICD-10-CM

## 2022-06-28 DIAGNOSIS — F41.9 ANXIETY AND DEPRESSION: ICD-10-CM

## 2022-06-30 RX ORDER — BUPROPION HYDROCHLORIDE 300 MG/1
TABLET ORAL
Qty: 90 TABLET | Refills: 0 | Status: SHIPPED | OUTPATIENT
Start: 2022-06-30 | End: 2022-09-21

## 2022-07-05 DIAGNOSIS — F41.9 ANXIETY AND DEPRESSION: ICD-10-CM

## 2022-07-05 DIAGNOSIS — F32.A ANXIETY AND DEPRESSION: ICD-10-CM

## 2022-07-05 DIAGNOSIS — E03.9 ACQUIRED HYPOTHYROIDISM: ICD-10-CM

## 2022-07-05 RX ORDER — ESCITALOPRAM OXALATE 20 MG/1
20 TABLET ORAL DAILY
Qty: 90 TABLET | Refills: 0 | Status: SHIPPED | OUTPATIENT
Start: 2022-07-05 | End: 2022-07-27

## 2022-08-19 ENCOUNTER — OFFICE VISIT (OUTPATIENT)
Dept: MEDICAL GROUP | Facility: PHYSICIAN GROUP | Age: 57
End: 2022-08-19
Payer: COMMERCIAL

## 2022-08-19 VITALS
BODY MASS INDEX: 30.34 KG/M2 | HEART RATE: 98 BPM | OXYGEN SATURATION: 97 % | RESPIRATION RATE: 16 BRPM | TEMPERATURE: 98.6 F | DIASTOLIC BLOOD PRESSURE: 72 MMHG | WEIGHT: 200.2 LBS | HEIGHT: 68 IN | SYSTOLIC BLOOD PRESSURE: 116 MMHG

## 2022-08-19 DIAGNOSIS — Z12.31 ENCOUNTER FOR SCREENING MAMMOGRAM FOR MALIGNANT NEOPLASM OF BREAST: ICD-10-CM

## 2022-08-19 DIAGNOSIS — N95.1 HOT FLASHES DUE TO MENOPAUSE: ICD-10-CM

## 2022-08-19 DIAGNOSIS — R21 RASH: ICD-10-CM

## 2022-08-19 DIAGNOSIS — E03.9 ACQUIRED HYPOTHYROIDISM: ICD-10-CM

## 2022-08-19 PROCEDURE — 99214 OFFICE O/P EST MOD 30 MIN: CPT | Performed by: NURSE PRACTITIONER

## 2022-08-19 RX ORDER — LEVOTHYROXINE SODIUM 0.12 MG/1
125 TABLET ORAL
Qty: 90 TABLET | Refills: 0 | Status: SHIPPED | OUTPATIENT
Start: 2022-08-19 | End: 2022-11-10 | Stop reason: SDUPTHER

## 2022-08-19 ASSESSMENT — FIBROSIS 4 INDEX: FIB4 SCORE: 0.88

## 2022-08-19 NOTE — ASSESSMENT & PLAN NOTE
Hot flashes since the age of 54, underwent hysterectomy.  Has been on transdermal estrogen pellets in the past.  After that has been taking 0.1 mg/day. Would like to restart, has been off this for 1 yr.   No Hx of breast ca, no OB GYN cancers.

## 2022-08-19 NOTE — ASSESSMENT & PLAN NOTE
New problem.  Small round bluish indented patch on right lateral buttock x3 months, not itchy, nonpainful, nontender, no bleeding.  Has not tried any treatment.

## 2022-08-19 NOTE — PROGRESS NOTES
Chief Complaint   Patient presents with    Medication Refill    Rash     buttocks       HISTORY OF PRESENT ILLNESS: Patient is a 56 y.o. female, established patient who presents today to discuss medical problems as listed below:    Health Maintenance:  COMPLETED     Hot flashes due to menopause  Hot flashes since the age of 54, underwent hysterectomy.  Has been on transdermal estrogen pellets in the past.  After that has been taking 0.1 mg/day. Would like to restart, has been off this for 1 yr.   No Hx of breast ca, no OB GYN cancers.    Rash  New problem.  Small round bluish indented patch on right lateral buttock x3 months, not itchy, nonpainful, nontender, no bleeding.  Has not tried any treatment.    Acquired hypothyroidism  [facility] [place]  Unable to obtain data at this time.  She denies hyper or hyperthyroid symptoms.  She is currently on levothyroxine 125 mcg.  Needing refills today.    Patient Active Problem List    Diagnosis Date Noted    Rash 08/19/2022    Hot flashes due to menopause 08/19/2022    Mixed hyperlipidemia 12/23/2021    Vitamin D deficiency 12/23/2021    Acquired hypothyroidism 12/02/2021    Anxiety and depression 12/02/2021    Acne vulgaris 12/02/2021    Environmental and seasonal allergies 12/02/2021    Leiomyoma of body of uterus 11/07/2019    S/P laparoscopic hysterectomy 11/06/2019    Allergic rhinitis 09/30/2015        Allergies: Azithromycin    Current Outpatient Medications   Medication Sig Dispense Refill    estradiol (CLIMARA) 0.1 MG/24HR PATCH WEEKLY       levothyroxine (SYNTHROID) 125 MCG Tab Take 1 Tablet by mouth every morning on an empty stomach. 90 Tablet 0    minocycline (MINOCIN) 100 MG Cap TAKE 1 CAPSULE BY MOUTH TWICE A DAY 60 Capsule 0    albuterol 108 (90 Base) MCG/ACT Aero Soln inhalation aerosol INHALE 2 PUFFS BY MOUTH EVERY 6 HOURS AS NEEDED FOR SHORTNESS OF BREATH 8.5 Each 0    escitalopram (LEXAPRO) 20 MG tablet TAKE 1  TABLET BY MOUTH EVERY DAY 90 Tablet 0    buPROPion (WELLBUTRIN XL) 300 MG XL tablet TAKE 1 TABLET BY MOUTH EVERY DAY IN THE MORNING 90 Tablet 0    magnesium oxide (MAG-OX) 400 MG Tab tablet Take 400 mg by mouth every day.      Multiple Vitamin (MULTI-VITAMIN) Tab Take 1 Tablet by mouth every day.      vitamin D2, Ergocalciferol, (DRISDOL) 1.25 MG (56061 UT) Cap capsule Take 1 Capsule by mouth every 7 days. 12 Capsule 3    tretinoin (RETIN-A) 0.05 % cream       pseudoephedrine (SUDAFED) 30 MG Tab Take 60 mg by mouth every four hours as needed for Congestion.      Fexofenadine HCl (ALLEGRA PO) Take  by mouth.      diphenhydrAMINE HCl (BENADRYL PO) Take  by mouth.      melatonin 5 mg Tab Take 5 mg by mouth every day.      ibuprofen (MOTRIN) 200 MG Tab Take 200 mg by mouth every 6 hours as needed.       No current facility-administered medications for this visit.       Social History     Tobacco Use    Smoking status: Never    Smokeless tobacco: Never   Vaping Use    Vaping Use: Never used   Substance Use Topics    Alcohol use: Yes     Comment: occ    Drug use: Never     Social History     Social History Narrative    Not on file       Family History   Problem Relation Age of Onset    Hyperlipidemia Mother     Hyperlipidemia Father        Allergies, past medical history, past surgical history, family history, social history reviewed and updated.    Review of Systems:     - Constitutional: Negative for fever, chills, unexpected weight change, and fatigue/generalized weakness.     - Respiratory: Negative for cough, sputum production, chest congestion, dyspnea, wheezing, and crackles.      - Cardiovascular: Negative for chest pain, palpitations, orthopnea, and bilateral lower extremity edema.       - Skin: small bluish patch on Right buttock     - Psychiatric/Behavioral: Negative for depression, suicidal/homicidal ideation and memory loss.      All other systems reviewed and are negative    Exam:    /72 (BP Location:  "Right arm, Patient Position: Sitting, BP Cuff Size: Adult)   Pulse 98   Temp 37 °C (98.6 °F) (Temporal)   Resp 16   Ht 1.727 m (5' 8\")   Wt 90.8 kg (200 lb 3.2 oz)   SpO2 97%   BMI 30.44 kg/m²  Body mass index is 30.44 kg/m².    Physical Exam:  Constitutional: Well-developed and well-nourished. Not diaphoretic. No distress.   Skin: Skin is warm and dry. No rash noted.  Cardiovascular: Regular rate and rhythm, S1 and S2 without murmur, rubs, or gallops.    Chest: Effort normal. Clear to auscultation throughout. No adventitious sounds. Neurological: Alert and oriented x 3.   Psychiatric:  Behavior, mood, and affect are appropriate.  MA/nursing note and vitals reviewed.    Assessment/Plan:  1. Rash  Idiopathic. Trial of OTC cortisone cream.  If no improvement or getting worse come back for reevaluation.    2. Acquired hypothyroidism  Will obtain lab results, refills given  - levothyroxine (SYNTHROID) 125 MCG Tab; Take 1 Tablet by mouth every morning on an empty stomach.  Dispense: 90 Tablet; Refill: 0    3. Hot flashes due to menopause  Pt to obtain mammogram, will follow up in 4 wks    4. Encounter for screening mammogram for malignant neoplasm of breast  - MA-SCREENING MAMMO BILAT W/TOMOSYNTHESIS W/CAD; Future       Discussed with patient possible alternative diagnoses, pt is to take all medications as prescribed. If symptoms persist FU w/PCP, if symptoms worsen go to emergency room. If experiencing any side effects from prescribed medications reports to the office immediately or go to emergency room.  Reviewed indication, dosage, usage and potential adverse effects of prescribed medications. Reviewed risks and benefits of treatment plan. Patient verbalizes understanding of all instruction and verbally agrees to plan.    No follow-ups on file. 1 mos  "

## 2022-08-19 NOTE — ASSESSMENT & PLAN NOTE
[facility] [place]  Unable to obtain data at this time.  She denies hyper or hyperthyroid symptoms.  She is currently on levothyroxine 125 mcg.  Needing refills today.

## 2022-09-16 DIAGNOSIS — L70.0 ACNE VULGARIS: ICD-10-CM

## 2022-09-16 RX ORDER — MINOCYCLINE HYDROCHLORIDE 100 MG/1
CAPSULE ORAL
Qty: 60 CAPSULE | Refills: 0 | Status: SHIPPED | OUTPATIENT
Start: 2022-09-16 | End: 2022-10-17 | Stop reason: SDUPTHER

## 2022-09-17 DIAGNOSIS — F32.A ANXIETY AND DEPRESSION: ICD-10-CM

## 2022-09-17 DIAGNOSIS — F41.9 ANXIETY AND DEPRESSION: ICD-10-CM

## 2022-09-21 RX ORDER — BUPROPION HYDROCHLORIDE 300 MG/1
TABLET ORAL
Qty: 90 TABLET | Refills: 0 | Status: SHIPPED | OUTPATIENT
Start: 2022-09-21 | End: 2022-11-10 | Stop reason: SDUPTHER

## 2022-10-03 ENCOUNTER — OFFICE VISIT (OUTPATIENT)
Dept: URGENT CARE | Facility: CLINIC | Age: 57
End: 2022-10-03
Payer: COMMERCIAL

## 2022-10-03 VITALS
OXYGEN SATURATION: 98 % | BODY MASS INDEX: 30.42 KG/M2 | TEMPERATURE: 97.3 F | HEART RATE: 99 BPM | DIASTOLIC BLOOD PRESSURE: 68 MMHG | SYSTOLIC BLOOD PRESSURE: 120 MMHG | RESPIRATION RATE: 14 BRPM | HEIGHT: 68 IN | WEIGHT: 200.7 LBS

## 2022-10-03 DIAGNOSIS — S46.912A LEFT SHOULDER STRAIN, INITIAL ENCOUNTER: ICD-10-CM

## 2022-10-03 PROCEDURE — 99213 OFFICE O/P EST LOW 20 MIN: CPT | Performed by: PHYSICIAN ASSISTANT

## 2022-10-03 RX ORDER — METHYLPREDNISOLONE 4 MG/1
TABLET ORAL
Qty: 21 TABLET | Refills: 0 | Status: SHIPPED | OUTPATIENT
Start: 2022-10-03 | End: 2023-04-19

## 2022-10-03 RX ORDER — CYCLOBENZAPRINE HCL 10 MG
10 TABLET ORAL 3 TIMES DAILY PRN
Qty: 30 TABLET | Refills: 0 | Status: SHIPPED | OUTPATIENT
Start: 2022-10-03 | End: 2023-04-19

## 2022-10-03 ASSESSMENT — ENCOUNTER SYMPTOMS
CHILLS: 0
MYALGIAS: 1
TINGLING: 0
FEVER: 0
COUGH: 0
PALPITATIONS: 0
HEADACHES: 0
WEAKNESS: 0
DIZZINESS: 0
SHORTNESS OF BREATH: 0
FALLS: 0

## 2022-10-03 ASSESSMENT — FIBROSIS 4 INDEX: FIB4 SCORE: 0.89

## 2022-10-03 NOTE — PROGRESS NOTES
Subjective:     CHIEF COMPLAINT  Chief Complaint   Patient presents with    Shoulder Pain     L shoulder pain X two weeks        HPI  Yuki Arshad is a very pleasant 57 y.o. female who presents to the clinic with atraumatic left shoulder pain x2 weeks.  Patient states the pain is generalized over the entire left shoulder.  States it is hard to pinpoint a specific location.  She denies any preceding injury or trauma.  Pain did start after she went mountain biking and rockclimbing however denies any specific incident which started the pain.  She has not noted any swelling or discoloration.  Has had occasional tingling in the left hand fourth and fifth digits however this has been present prior to her left shoulder pain.  Denies any neck pain however does describe some tightness over her left trapezius.  No limitations in range of motion.  No associated headaches, lightheadedness, chest pain or shortness of breath.  No previous injury or surgery to left shoulder.  Has been taking Tylenol, ibuprofen, Voltaren gel as well as alternating heat and ice without improvement.    REVIEW OF SYSTEMS  Review of Systems   Constitutional:  Negative for chills, fever and malaise/fatigue.   Respiratory:  Negative for cough and shortness of breath.    Cardiovascular:  Negative for chest pain and palpitations.   Musculoskeletal:  Positive for joint pain and myalgias. Negative for falls.   Skin:  Negative for rash.   Neurological:  Negative for dizziness, tingling, weakness and headaches.     PAST MEDICAL HISTORY  Patient Active Problem List    Diagnosis Date Noted    Rash 08/19/2022    Hot flashes due to menopause 08/19/2022    Mixed hyperlipidemia 12/23/2021    Vitamin D deficiency 12/23/2021    Acquired hypothyroidism 12/02/2021    Anxiety and depression 12/02/2021    Acne vulgaris 12/02/2021    Environmental and seasonal allergies 12/02/2021    Leiomyoma of body of uterus 11/07/2019    S/P laparoscopic hysterectomy 11/06/2019     "Allergic rhinitis 09/30/2015       SURGICAL HISTORY   has a past surgical history that includes abdominal hysterectomy total (2018).    ALLERGIES  Allergies   Allergen Reactions    Azithromycin Vomiting     Other reaction(s): GI Intolerance  Headache         CURRENT MEDICATIONS  Home Medications       Reviewed by Akhil Rivas P.A.-C. (Physician Assistant) on 10/03/22 at 1504  Med List Status: <None>     Medication Last Dose Status   albuterol 108 (90 Base) MCG/ACT Aero Soln inhalation aerosol PRN Active   buPROPion (WELLBUTRIN XL) 300 MG XL tablet Taking Active   diphenhydrAMINE HCl (BENADRYL PO) Taking Active   escitalopram (LEXAPRO) 20 MG tablet Taking Active   estradiol (CLIMARA) 0.1 MG/24HR PATCH WEEKLY Taking Active   Fexofenadine HCl (ALLEGRA PO) Taking Active   ibuprofen (MOTRIN) 200 MG Tab Taking Active   levothyroxine (SYNTHROID) 125 MCG Tab Taking Active   magnesium oxide (MAG-OX) 400 MG Tab tablet Taking Active   melatonin 5 mg Tab Not Taking Active   minocycline (MINOCIN) 100 MG Cap Taking Active   Multiple Vitamin (MULTI-VITAMIN) Tab Taking Active   pseudoephedrine (SUDAFED) 30 MG Tab PRN Active   tretinoin (RETIN-A) 0.05 % cream Taking Active   vitamin D2, Ergocalciferol, (DRISDOL) 1.25 MG (51466 UT) Cap capsule Taking Active                    SOCIAL HISTORY  Social History     Tobacco Use    Smoking status: Never    Smokeless tobacco: Never   Vaping Use    Vaping Use: Never used   Substance and Sexual Activity    Alcohol use: Yes     Comment: occ    Drug use: Never    Sexual activity: Yes       FAMILY HISTORY  Family History   Problem Relation Age of Onset    Hyperlipidemia Mother     Hyperlipidemia Father           Objective:     VITAL SIGNS: /68 (BP Location: Right arm, Patient Position: Sitting, BP Cuff Size: Adult long)   Pulse 99   Temp 36.3 °C (97.3 °F) (Temporal)   Resp 14   Ht 1.727 m (5' 8\")   Wt 91 kg (200 lb 11.2 oz)   SpO2 98%   BMI 30.52 kg/m²     PHYSICAL EXAM  Physical " Exam  Constitutional:       General: She is not in acute distress.     Appearance: Normal appearance. She is not ill-appearing, toxic-appearing or diaphoretic.   HENT:      Head: Normocephalic and atraumatic.   Eyes:      Conjunctiva/sclera: Conjunctivae normal.   Cardiovascular:      Rate and Rhythm: Normal rate and regular rhythm.      Pulses: Normal pulses.      Heart sounds: Normal heart sounds.   Pulmonary:      Effort: Pulmonary effort is normal.   Musculoskeletal:         General: No swelling. Normal range of motion.      Cervical back: Normal range of motion. No rigidity or tenderness.      Comments: Left shoulder: No obvious deformity, discoloration or edema present.  No bony tenderness to palpation.  No tenderness over the AC joint.  Mild tenderness to palpation over the bicipital groove.  Left-sided trapezius muscle spasm.  Full and pain-free cervical range of motion.  Patient maintains full and pain-free left shoulder range of motion.  Resisted shoulder flexion and abduction 5/5 bilaterally.  Negative Neer's and Nicole.  Negative empty can test.   strength 5/5 bilaterally.   Skin:     Capillary Refill: Capillary refill takes less than 2 seconds.   Neurological:      General: No focal deficit present.      Mental Status: She is alert and oriented to person, place, and time. Mental status is at baseline.       Assessment/Plan:     1. Left shoulder strain, initial encounter  - methylPREDNISolone (MEDROL DOSEPAK) 4 MG Tablet Therapy Pack; Follow schedule on package instructions.  Dispense: 21 Tablet; Refill: 0  - cyclobenzaprine (FLEXERIL) 10 mg Tab; Take 1 Tablet by mouth 3 times a day as needed for Muscle Spasms.  Dispense: 30 Tablet; Refill: 0  - Referral to Sports Medicine      MDM/Comments:    Findings appear consistent with tendinitis of the left shoulder.  No preceding injury or trauma.  Currently do not have x-ray available in clinic.  On exam patient maintains full range of motion of the left  shoulder.  Strength 5/5 as compared to the right shoulder.  No bony tenderness to palpation.  Patient has tried OTC anti-inflammatories without any improvement.  Trial of oral steroids to decrease inflammation.  Course of Flexeril in the evening to help with trapezius muscle spasm.  Discussed sedative effects.  Referral placed for the patient follow-up with our sports medicine team if symptoms worsen or fail to improve.  At home stretching exercises encouraged.    Differential diagnosis, natural history, supportive care, and indications for immediate follow-up discussed. All questions answered. Patient agrees with the plan of care.    Follow-up as needed if symptoms worsen or fail to improve to PCP, Urgent care or Emergency Room.    I have personally reviewed prior external notes and test results pertinent to today's visit.  I have independently reviewed and interpreted all diagnostics ordered during this urgent care acute visit.   Discussed management options (risks,benefits, and alternatives to treatment). Pt expresses understanding and the treatment plan was agreed upon. Questions were encouraged and answered to pt's satisfaction.    Please note that this dictation was created using voice recognition software. I have made a reasonable attempt to correct obvious errors, but I expect that there are errors of grammar and possibly content that I did not discover before finalizing the note.

## 2022-10-17 DIAGNOSIS — L70.0 ACNE VULGARIS: ICD-10-CM

## 2022-10-17 RX ORDER — MINOCYCLINE HYDROCHLORIDE 100 MG/1
100 CAPSULE ORAL 2 TIMES DAILY
Qty: 60 CAPSULE | Refills: 0 | Status: SHIPPED | OUTPATIENT
Start: 2022-10-17 | End: 2022-11-10 | Stop reason: SDUPTHER

## 2022-10-17 NOTE — TELEPHONE ENCOUNTER
Received request via: Pharmacy    Was the patient seen in the last year in this department? Yes    Does the patient have an active prescription (recently filled or refills available) for medication(s) requested? No    Last OV 08/19/22  Last labs 12/10/21  Next OV NONE

## 2022-11-10 DIAGNOSIS — E03.9 ACQUIRED HYPOTHYROIDISM: ICD-10-CM

## 2022-11-10 DIAGNOSIS — L70.0 ACNE VULGARIS: ICD-10-CM

## 2022-11-10 DIAGNOSIS — F41.9 ANXIETY AND DEPRESSION: ICD-10-CM

## 2022-11-10 DIAGNOSIS — E55.9 VITAMIN D DEFICIENCY: ICD-10-CM

## 2022-11-10 DIAGNOSIS — F32.A ANXIETY AND DEPRESSION: ICD-10-CM

## 2022-11-11 RX ORDER — LEVOTHYROXINE SODIUM 0.12 MG/1
125 TABLET ORAL
Qty: 90 TABLET | Refills: 0 | Status: SHIPPED | OUTPATIENT
Start: 2022-11-11 | End: 2023-01-19 | Stop reason: SDUPTHER

## 2022-11-11 RX ORDER — MINOCYCLINE HYDROCHLORIDE 100 MG/1
100 CAPSULE ORAL 2 TIMES DAILY
Qty: 60 CAPSULE | Refills: 0 | Status: SHIPPED | OUTPATIENT
Start: 2022-11-11 | End: 2022-12-27

## 2022-11-11 RX ORDER — BUPROPION HYDROCHLORIDE 300 MG/1
300 TABLET ORAL EVERY MORNING
Qty: 90 TABLET | Refills: 0 | Status: SHIPPED | OUTPATIENT
Start: 2022-11-11 | End: 2023-01-19 | Stop reason: SDUPTHER

## 2022-11-11 RX ORDER — ESCITALOPRAM OXALATE 20 MG/1
20 TABLET ORAL DAILY
Qty: 90 TABLET | Refills: 0 | Status: SHIPPED | OUTPATIENT
Start: 2022-11-11 | End: 2023-01-19 | Stop reason: SDUPTHER

## 2022-11-11 RX ORDER — ERGOCALCIFEROL 1.25 MG/1
50000 CAPSULE ORAL
Qty: 12 CAPSULE | Refills: 3 | Status: SHIPPED | OUTPATIENT
Start: 2022-11-11

## 2022-11-11 NOTE — TELEPHONE ENCOUNTER
Received request via: Patient    Was the patient seen in the last year in this department? Yes    Does the patient have an active prescription (recently filled or refills available) for medication(s) requested? No    Does the patient have assisted Plus and need 100 day supply (blood pressure, diabetes and cholesterol meds only)? Patient does not have SCP

## 2022-11-11 NOTE — TELEPHONE ENCOUNTER
Received request via: Patient    Was the patient seen in the last year in this department? Yes    Does the patient have an active prescription (recently filled or refills available) for medication(s) requested? No    Does the patient have [facility] Plus and need 100 day supply (blood pressure, diabetes and cholesterol meds only)? Patient does not have SCP

## 2022-12-27 DIAGNOSIS — L70.0 ACNE VULGARIS: ICD-10-CM

## 2022-12-27 RX ORDER — MINOCYCLINE HYDROCHLORIDE 100 MG/1
CAPSULE ORAL
Qty: 60 CAPSULE | Refills: 0 | Status: SHIPPED | OUTPATIENT
Start: 2022-12-27 | End: 2023-04-19 | Stop reason: SDUPTHER

## 2022-12-27 NOTE — TELEPHONE ENCOUNTER
Received request via: Pharmacy    Was the patient seen in the last year in this department? Yes    Does the patient have an active prescription (recently filled or refills available) for medication(s) requested? No    Does the patient have [facility] and need 100 day supply (blood pressure, diabetes and cholesterol meds only)? Patient does not have SCP

## 2023-01-19 PROBLEM — M75.52 BURSITIS OF LEFT SHOULDER: Status: ACTIVE | Noted: 2022-11-17

## 2023-04-19 PROBLEM — J30.2 SEASONAL ALLERGIES: Status: ACTIVE | Noted: 2021-12-02

## 2023-04-19 PROBLEM — Z12.83 SKIN EXAM, SCREENING FOR CANCER: Status: ACTIVE | Noted: 2023-04-19

## 2023-10-19 PROBLEM — R73.03 PRE-DIABETES: Status: ACTIVE | Noted: 2023-10-19

## 2023-10-19 PROBLEM — L65.9 HAIR LOSS: Status: ACTIVE | Noted: 2023-10-19

## 2023-10-19 PROBLEM — M75.110 PARTIAL THICKNESS ROTATOR CUFF TEAR: Status: ACTIVE | Noted: 2023-02-09

## 2023-10-19 PROBLEM — M75.42 IMPINGEMENT SYNDROME OF LEFT SHOULDER REGION: Status: ACTIVE | Noted: 2023-01-05

## 2024-05-07 DIAGNOSIS — R06.02 SOB (SHORTNESS OF BREATH): ICD-10-CM

## 2024-05-08 NOTE — TELEPHONE ENCOUNTER
Received request via: Patient    Was the patient seen in the last year in this department? Yes    Does the patient have an active prescription (recently filled or refills available) for medication(s) requested? No    Pharmacy Name: [facility]     Does the patient have [facility] and need 100 day supply (blood pressure, diabetes and cholesterol meds only)? Patient does not have SCP

## 2024-05-09 PROBLEM — R31.1 BENIGN ESSENTIAL MICROSCOPIC HEMATURIA: Status: ACTIVE | Noted: 2024-05-09

## 2024-05-09 RX ORDER — ALBUTEROL SULFATE 90 UG/1
2 AEROSOL, METERED RESPIRATORY (INHALATION) EVERY 6 HOURS PRN
Qty: 8.5 EACH | Refills: 1 | Status: SHIPPED | OUTPATIENT
Start: 2024-05-09